# Patient Record
Sex: MALE | URBAN - METROPOLITAN AREA
[De-identification: names, ages, dates, MRNs, and addresses within clinical notes are randomized per-mention and may not be internally consistent; named-entity substitution may affect disease eponyms.]

---

## 2021-08-02 ENCOUNTER — NURSE TRIAGE (OUTPATIENT)
Dept: NURSING | Facility: CLINIC | Age: 31
End: 2021-08-02

## 2021-08-03 NOTE — TELEPHONE ENCOUNTER
Tonsils started aching really bad  -started with a scratch on Saturday     Extremely hard to swallow saliva   Rates pain as 8/10  Unable to swallow solids  Very difficult to swallow liquid  Pain affects the whole left side of face, especially when he swallows    Couldn't see the back of his throat very well when he looked into mirror.     The last time it felt like this he had to miss work for a week  -diagnosed with acute tonsillitis  -ended up having to get an injection of antibiotics     No fever  No difficulty breathing   No headache  No abdominal pain   No rash   No signs of dehydration     Triaged to a disposition of See PCP within 24 hours. Patient states that he doesn't want this to get as bad as it did the last time and it seems to be getting worse fast. Therefor he wants to go to ED. Advised that our advice is to be seen within 24 hours, but that it is his choice to go to ED.     Patient asked what the cost would be with his insurance. Advised patient that [facility] do not have that information, and he would need to call his insurance plan to discuss this information. Patient verbalizes understanding.     COVID 19 Nurse Triage Plan/Patient Instructions    Please be aware that novel coronavirus (COVID-19) may be circulating in the community. If you develop symptoms such as fever, cough, or SOB or if you have concerns about the presence of another infection including coronavirus (COVID-19), please contact your health care provider or visit [email or web address].     Disposition/Instructions    Virtual Visit with provider recommended. Reference Visit Selection Guide.  In-Person Visit with provider recommended. Reference Visit Selection Guide.    Thank you for taking steps to prevent the spread of this virus.  o Limit your contact with others.  o Wear a simple mask to cover your cough.  o Wash your hands well and often.    Resources     [facility]: About COVID-19:  [email or web address]    CDC: What to Do If You're Sick: www.cdc.gov/coronavirus/2019-ncov/about/steps-when-sick.html    CDC: Ending Home Isolation: www.cdc.gov/coronavirus/2019-ncov/hcp/disposition-in-home-patients.html     CDC: Caring for Someone: www.cdc.gov/coronavirus/2019-ncov/if-you-are-sick/care-for-someone.html     [facility]: Interim Guidance for Hospital Discharge to Home: [email or web address]/hcp/hospdischarge.pdf    [facility] clinical trials (COVID-19 research studies): [email or web address]-clinical-[email or web address]     Below are the COVID-19 hotlines at the [place] [facility] ([facility]). Interpreters are available.   o For health questions: Call 636-381-2909 or 1-891.305.7314 (7 a.m. to 7 p.m.)  o For questions about schools and childcare: Call 601-361-9323 or 1-712.960.3932 (7 a.m. to 7 p.m.)     Reason for Disposition    SEVERE (e.g., excruciating) throat pain    Additional Information    Negative: Severe difficulty breathing (e.g., struggling for each breath, speaks in single words, stridor)    Negative: Sounds like a life-threatening emergency to the triager    Negative: [1] Diagnosed strep throat AND [2] taking antibiotic AND [3] symptoms continue    Negative: Throat culture results, call about    Negative: Productive cough is main symptom    Negative: Non-productive cough is main symptom    Negative: Hoarseness is main symptom    Negative: Runny nose is main symptom    Negative: [1] Drooling or spitting out saliva (because can't swallow) AND [2] normal breathing    Negative: Unable to open mouth completely    Negative: [1] Difficulty breathing AND [2] not severe    Negative: Fever > 104 F (40 C)    Negative: [1] Refuses to drink anything AND [2] for > 12 hours    Negative: [1] Drinking very little AND [2] dehydration suspected (e.g., no urine > 12 hours, very dry mouth, very lightheaded)    Negative: Patient sounds very sick or weak to the  triager    Protocols used: SORE THROAT-A-    Felisa Burrwos RN on 8/3/2021 at 12:03 AM